# Patient Record
Sex: MALE | Race: WHITE
[De-identification: names, ages, dates, MRNs, and addresses within clinical notes are randomized per-mention and may not be internally consistent; named-entity substitution may affect disease eponyms.]

---

## 2020-04-08 ENCOUNTER — HOSPITAL ENCOUNTER (EMERGENCY)
Dept: HOSPITAL 54 - ER | Age: 50
Discharge: HOME | End: 2020-04-08
Payer: COMMERCIAL

## 2020-04-08 VITALS — BODY MASS INDEX: 25.06 KG/M2 | HEIGHT: 72 IN | WEIGHT: 185 LBS

## 2020-04-08 VITALS — DIASTOLIC BLOOD PRESSURE: 97 MMHG | SYSTOLIC BLOOD PRESSURE: 166 MMHG
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
## 2020-04-08 DIAGNOSIS — Z60.2: ICD-10-CM
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
## 2020-04-08 DIAGNOSIS — Z88.6: ICD-10-CM

## 2020-04-08 DIAGNOSIS — S61.011A: Primary | ICD-10-CM

## 2020-04-08 PROCEDURE — 73140 X-RAY EXAM OF FINGER(S): CPT

## 2020-04-08 PROCEDURE — 90471 IMMUNIZATION ADMIN: CPT

## 2020-04-08 PROCEDURE — 12004 RPR S/N/AX/GEN/TRK7.6-12.5CM: CPT

## 2020-04-08 PROCEDURE — 90715 TDAP VACCINE 7 YRS/> IM: CPT

## 2020-04-08 PROCEDURE — A6403 STERILE GAUZE>16 <= 48 SQ IN: HCPCS

## 2020-04-08 PROCEDURE — 99283 EMERGENCY DEPT VISIT LOW MDM: CPT

## 2020-04-08 SDOH — SOCIAL STABILITY - SOCIAL INSECURITY: PROBLEMS RELATED TO LIVING ALONE: Z60.2

## 2020-04-08 NOTE — NUR
RIGHT THUMB COVERED WITH DRY DRESSING AND APPLIED FINGER SPLINT. PATIENT A/OX4, 
BREATHING EVEN AND UNLABORED, NO SOB NOTED, NEEDS ATTENDED, KEPT COMFORTABLE. 
Patient discharged to home in stable condition. Written and verbal after care 
instructions given. Patient verbalizes understanding of instruction.

## 2020-04-08 NOTE — NUR
BIB RA FROM HOME,LACERATION/AVULSION,RIGHT THUMB. PATIENT A/OX4, BREATHING EVEN 
AND UNLABORED, NO SOB NOTED. NEEDS ATTENDED, KEPT COMFORTABLE.